# Patient Record
Sex: FEMALE | URBAN - METROPOLITAN AREA
[De-identification: names, ages, dates, MRNs, and addresses within clinical notes are randomized per-mention and may not be internally consistent; named-entity substitution may affect disease eponyms.]

---

## 2020-08-05 ENCOUNTER — OFFICE VISIT (OUTPATIENT)
Dept: URBAN - METROPOLITAN AREA CLINIC 17 | Facility: CLINIC | Age: 35
End: 2020-08-05
Payer: COMMERCIAL

## 2020-08-05 DIAGNOSIS — H16.011 CENTRAL CORNEAL ULCER, RIGHT EYE: Primary | ICD-10-CM

## 2020-08-05 PROCEDURE — 99203 OFFICE O/P NEW LOW 30 MIN: CPT | Performed by: OPTOMETRIST

## 2020-08-05 RX ORDER — TOBRAMYCIN AND DEXAMETHASONE 3; 1 MG/ML; MG/ML
SUSPENSION/ DROPS OPHTHALMIC
Qty: 1 | Refills: 0 | Status: ACTIVE
Start: 2020-08-05

## 2020-08-05 NOTE — IMPRESSION/PLAN
Impression: Central corneal ulcer, right eye: H16.011. Plan: Discussed diagnosis in detail with patient. Start tobramycin-dexamethasone QID OD x 1 week then d/c(Erx sent). Recommend staying out of CL for a week. Recommend tossing out current CL and case. Discussed good CL care and replacing CL every 2-4 weeks depending on which type of CL pt has.